# Patient Record
(demographics unavailable — no encounter records)

---

## 2025-06-05 NOTE — HISTORY OF PRESENT ILLNESS
[de-identified] : loud snoring, some gasping, many wakeups on astelin and flonase every day follows with AI no epistaxis doing well in school behavior normal